# Patient Record
Sex: FEMALE | Race: AMERICAN INDIAN OR ALASKA NATIVE
[De-identification: names, ages, dates, MRNs, and addresses within clinical notes are randomized per-mention and may not be internally consistent; named-entity substitution may affect disease eponyms.]

---

## 2019-09-28 ENCOUNTER — HOSPITAL ENCOUNTER (OUTPATIENT)
Dept: HOSPITAL 5 - TRG | Age: 44
Discharge: HOME | End: 2019-09-28
Attending: OBSTETRICS & GYNECOLOGY
Payer: COMMERCIAL

## 2019-09-28 VITALS — DIASTOLIC BLOOD PRESSURE: 74 MMHG | SYSTOLIC BLOOD PRESSURE: 122 MMHG

## 2019-09-28 DIAGNOSIS — O47.1: Primary | ICD-10-CM

## 2019-09-28 DIAGNOSIS — Z3A.38: ICD-10-CM

## 2019-09-28 PROCEDURE — 76815 OB US LIMITED FETUS(S): CPT

## 2019-09-28 PROCEDURE — 76819 FETAL BIOPHYS PROFIL W/O NST: CPT

## 2019-09-28 PROCEDURE — 59025 FETAL NON-STRESS TEST: CPT

## 2019-09-28 NOTE — ULTRASOUND REPORT
ULTRASOUND FETAL BIOPHYSICAL PROFILE AND LIMITED OB ULTRASOUND



INDICATION / CLINICAL INFORMATION:

LIBBY, Decreased fetal movement.



COMPARISON:

None available.



FINDINGS:



FETAL BREATHING MOVEMENT = 2

GROSS BODY MOVEMENT = 2

FETAL TONE = 2

QUALITATIVE AMNIOTIC FLUID VOLUME = 2



TOTAL BIOPHYSICAL SCORE = 8/8



AMNIOTIC FLUID INDEX (cm) =  20.8

PRESENTATION: Cephalic. 

FETAL HEART RATE (beats per minute): 153 



IMPRESSION:

1. Fetal biophysical profile = 8/8 

2. LIBBY = 20.8 cm



Signer Name: Alvin Morin MD 

Signed: 9/28/2019 12:18 PM

 Workstation Name: [id number]

## 2019-09-28 NOTE — PROGRESS NOTE
Subjective





- Subjective


Date of service: 09/28/19


Interval history: 





Patient seen in OB triage


CC: decreased fetal movement


no contractions, no loss of fluid, no vaginal bleeding


+ve maternal obesity and fetal polyhydramnios contributing to maternal 

perception of movement. 


BPP 8/8


NST: difficult to trace but at bedside auscultation +ve accelerations and god 

beat to beat variability with baseline of 150bpm. 


PLan to d/c to home with labor precautions. 


Plan for ERCS 10/9/19


not in labor, maternal, fetal status reassuring at bedside. 


Kin SYLVESTER








Objective





- Vital Signs


Vital Signs: 


                               Vital Signs - 12hr











  09/28/19 09/28/19 09/28/19





  10:32 10:36 11:01


 


Temperature 98.3 F  


 


Pulse Rate  91 H 101 H


 


Respiratory 18  





Rate   


 


Blood Pressure  122/74 


 


O2 Sat by Pulse   95





Oximetry   














  09/28/19 09/28/19 09/28/19





  11:06 11:11 11:16


 


Temperature   


 


Pulse Rate 98 H 97 H 99 H


 


Respiratory   





Rate   


 


Blood Pressure   


 


O2 Sat by Pulse 95 97 96





Oximetry   














  09/28/19 09/28/19 09/28/19





  11:21 11:26 11:31


 


Temperature   


 


Pulse Rate 98 H 96 H 98 H


 


Respiratory   





Rate   


 


Blood Pressure   


 


O2 Sat by Pulse 97 96 98





Oximetry   














  09/28/19 09/28/19 09/28/19





  11:36 11:41 11:46


 


Temperature   


 


Pulse Rate 96 H 99 H 95 H


 


Respiratory   





Rate   


 


Blood Pressure   


 


O2 Sat by Pulse 97 96 97





Oximetry   














  09/28/19 09/28/19 09/28/19





  11:51 11:53 11:56


 


Temperature   


 


Pulse Rate 100 H 97 H 97 H


 


Respiratory   





Rate   


 


Blood Pressure   


 


O2 Sat by Pulse 96 92 97





Oximetry   














  09/28/19 09/28/19





  12:01 12:06


 


Temperature  


 


Pulse Rate 102 H 99 H


 


Respiratory  





Rate  


 


Blood Pressure  


 


O2 Sat by Pulse 97 98





Oximetry

## 2019-09-28 NOTE — ULTRASOUND REPORT
ULTRASOUND FETAL BIOPHYSICAL PROFILE AND LIMITED OB ULTRASOUND



INDICATION / CLINICAL INFORMATION:

LIBBY, Decreased fetal movement.



COMPARISON:

None available.



FINDINGS:



FETAL BREATHING MOVEMENT = 2

GROSS BODY MOVEMENT = 2

FETAL TONE = 2

QUALITATIVE AMNIOTIC FLUID VOLUME = 2



TOTAL BIOPHYSICAL SCORE = 8/8



AMNIOTIC FLUID INDEX (cm) =  20.8

PRESENTATION: Cephalic. 

FETAL HEART RATE (beats per minute): 153 



IMPRESSION:

1. Fetal biophysical profile = 8/8 

2. LIBBY = 20.8 cm



Signer Name: Alvin Morin MD 

Signed: 9/28/2019 12:18 PM

 Workstation Name: [id number]

## 2019-10-03 ENCOUNTER — HOSPITAL ENCOUNTER (INPATIENT)
Dept: HOSPITAL 5 - APU | Age: 44
LOS: 9 days | Discharge: HOME | End: 2019-10-12
Attending: OBSTETRICS & GYNECOLOGY | Admitting: OBSTETRICS & GYNECOLOGY
Payer: COMMERCIAL

## 2019-10-03 DIAGNOSIS — O34.211: Primary | ICD-10-CM

## 2019-10-03 DIAGNOSIS — Z88.1: ICD-10-CM

## 2019-10-03 DIAGNOSIS — E03.9: ICD-10-CM

## 2019-10-03 DIAGNOSIS — K66.0: ICD-10-CM

## 2019-10-03 DIAGNOSIS — Z3A.39: ICD-10-CM

## 2019-10-03 DIAGNOSIS — D62: ICD-10-CM

## 2019-10-03 DIAGNOSIS — Z79.899: ICD-10-CM

## 2019-10-03 DIAGNOSIS — E66.01: ICD-10-CM

## 2019-10-03 DIAGNOSIS — K21.9: ICD-10-CM

## 2019-10-03 PROCEDURE — 81001 URINALYSIS AUTO W/SCOPE: CPT

## 2019-10-03 PROCEDURE — 82803 BLOOD GASES ANY COMBINATION: CPT

## 2019-10-03 PROCEDURE — 76819 FETAL BIOPHYS PROFIL W/O NST: CPT

## 2019-10-03 PROCEDURE — 85014 HEMATOCRIT: CPT

## 2019-10-03 PROCEDURE — 80053 COMPREHEN METABOLIC PANEL: CPT

## 2019-10-03 PROCEDURE — 85007 BL SMEAR W/DIFF WBC COUNT: CPT

## 2019-10-03 PROCEDURE — 86901 BLOOD TYPING SEROLOGIC RH(D): CPT

## 2019-10-03 PROCEDURE — 85027 COMPLETE CBC AUTOMATED: CPT

## 2019-10-03 PROCEDURE — 88307 TISSUE EXAM BY PATHOLOGIST: CPT

## 2019-10-03 PROCEDURE — 83615 LACTATE (LD) (LDH) ENZYME: CPT

## 2019-10-03 PROCEDURE — 86900 BLOOD TYPING SEROLOGIC ABO: CPT

## 2019-10-03 PROCEDURE — 86592 SYPHILIS TEST NON-TREP QUAL: CPT

## 2019-10-03 PROCEDURE — 86850 RBC ANTIBODY SCREEN: CPT

## 2019-10-03 PROCEDURE — 85018 HEMOGLOBIN: CPT

## 2019-10-03 PROCEDURE — 76815 OB US LIMITED FETUS(S): CPT

## 2019-10-03 PROCEDURE — 86762 RUBELLA ANTIBODY: CPT

## 2019-10-03 PROCEDURE — 85025 COMPLETE CBC W/AUTO DIFF WBC: CPT

## 2019-10-03 PROCEDURE — 84550 ASSAY OF BLOOD/URIC ACID: CPT

## 2019-10-03 PROCEDURE — 36415 COLL VENOUS BLD VENIPUNCTURE: CPT

## 2019-10-06 ENCOUNTER — HOSPITAL ENCOUNTER (OUTPATIENT)
Dept: HOSPITAL 5 - TRG | Age: 44
Discharge: HOME | End: 2019-10-06
Attending: OBSTETRICS & GYNECOLOGY
Payer: COMMERCIAL

## 2019-10-06 VITALS — DIASTOLIC BLOOD PRESSURE: 66 MMHG | SYSTOLIC BLOOD PRESSURE: 109 MMHG

## 2019-10-06 DIAGNOSIS — Z3A.39: ICD-10-CM

## 2019-10-06 DIAGNOSIS — R42: ICD-10-CM

## 2019-10-06 DIAGNOSIS — R10.9: ICD-10-CM

## 2019-10-06 DIAGNOSIS — O26.893: Primary | ICD-10-CM

## 2019-10-06 LAB
ALBUMIN SERPL-MCNC: 3.3 G/DL (ref 3.9–5)
ALT SERPL-CCNC: 10 UNITS/L (ref 7–56)
BILIRUB UR QL STRIP: (no result)
BLOOD UR QL VISUAL: (no result)
BUN SERPL-MCNC: 10 MG/DL (ref 7–17)
BUN/CREAT SERPL: 17 %
CALCIUM SERPL-MCNC: 9.7 MG/DL (ref 8.4–10.2)
HCT VFR BLD CALC: 36.2 % (ref 30.3–42.9)
HEMOLYSIS INDEX: 20
HGB BLD-MCNC: 12 GM/DL (ref 10.1–14.3)
MCHC RBC AUTO-ENTMCNC: 33 % (ref 30–34)
MCV RBC AUTO: 89 FL (ref 79–97)
MUCOUS THREADS #/AREA URNS HPF: (no result) /HPF
PH UR STRIP: 7 [PH] (ref 5–7)
PLATELET # BLD: 246 K/MM3 (ref 140–440)
PROT UR STRIP-MCNC: (no result) MG/DL
RBC # BLD AUTO: 4.08 M/MM3 (ref 3.65–5.03)
RBC #/AREA URNS HPF: 2 /HPF (ref 0–6)
URATE SERPL-MCNC: 4.8 MG/DL (ref 3.5–7.6)
UROBILINOGEN UR-MCNC: < 2 MG/DL (ref ?–2)
WBC #/AREA URNS HPF: 1 /HPF (ref 0–6)

## 2019-10-06 PROCEDURE — 76819 FETAL BIOPHYS PROFIL W/O NST: CPT

## 2019-10-06 PROCEDURE — 85027 COMPLETE CBC AUTOMATED: CPT

## 2019-10-06 PROCEDURE — 76815 OB US LIMITED FETUS(S): CPT

## 2019-10-06 PROCEDURE — 81001 URINALYSIS AUTO W/SCOPE: CPT

## 2019-10-06 PROCEDURE — 36415 COLL VENOUS BLD VENIPUNCTURE: CPT

## 2019-10-06 PROCEDURE — 84550 ASSAY OF BLOOD/URIC ACID: CPT

## 2019-10-06 PROCEDURE — 80053 COMPREHEN METABOLIC PANEL: CPT

## 2019-10-06 PROCEDURE — 83615 LACTATE (LD) (LDH) ENZYME: CPT

## 2019-10-06 NOTE — ULTRASOUND REPORT
ULTRASOUND OBSTETRIC LIMITED 

ULTRASOUND FETAL BIOPHYSICAL PROFILE



INDICATION / CLINICAL INFORMATION:

Abdominal pain. Clinical dates of 38 weeks 4 days



COMPARISON:

9/28/2019



FINDINGS:



FETAL BREATHING MOVEMENT = 2

GROSS BODY MOVEMENT = 2

FETAL TONE = 2

QUALITATIVE AMNIOTIC FLUID VOLUME = 2



TOTAL BIOPHYSICAL SCORE = 8/8



AMNIOTIC FLUID INDEX (cm) =  15.2 cm

PRESENTATION: Cephalic. 

FETAL HEART RATE (beats per minute): 1 56 bpm 



ADDITIONAL FINDINGS: The placenta is located anteriorly, left lateral in position. Grade 3.



IMPRESSION:

1. Fetal Biophysical Score = 8/8

2. Single viable IUP in a cephalic presentation with anterior placenta.



Signer Name: Barb Jefferson MD 

Signed: 10/6/2019 11:00 PM

 Workstation Name: [id number]

## 2019-10-06 NOTE — PROGRESS NOTE
Assessment and Plan


A: Pregnancy at 38 weeks, 4 days gestation.


Not in active labor. 


Abdominal/pelvic pain--resolved.


BPP 8/8 and normal OB US.


P: Consulted with Dr. Landaverde re: patient and her history of previous C/S and 

previous myomectomy. Dr. Landaverde states is it OK to discharge patient home.


Discharge instructions and warning signs discussed with patient. Advised patient

to perform daily fetal movement counting. Warning signs of late pregnancy and 

signs of labor discussed. Advised patient to keep her scheduled appointment 

tomorrow at [facility] and return for repeat C/S as scheduled this week. 

Return promptly if any problems in the interim. 











Subjective





- Subjective


Date of service: 10/06/19


Principal diagnosis: Pregnancy at 38 4/7 weeks; R/O labor


Interval history: 


44 year old  presents to triage at 38 4/7 weeks gestation complaining of 

a brief sharp abdominal pain in upper abdomen while napping at home earlier this

afternoon. Patient states pain was brief and resolved by the time she came to 

the hospital. Patient denies vaginal bleeding or leaking of fluid or regular 

contractions. Patient denies urinary or bowel symptoms. Patient reports active 

fetal movement. Patient denies falls or abdominal trauma. 


Patient receives prenatal care at [facility] and she states she has an 

appointment there tomorrow. Patient states she was told that her BP was high at 

her last visit and she states she was also told she had protein in her urine at 

her last visit. She states she is scheduled for a  section this week on 

Wednesday 10/9/19. History of a low transverse  section 23 years ago and

history of a myomectomy 10 years ago. Patient denies any pain on scar. 








Patient reports: fetal movement normal, no loss of fluid, no vaginal bleeding, 

no contractions





Objective





- Vital Signs


Vital Signs: 


                               Vital Signs - 12hr











  10/06/19 10/06/19 10/06/19





  16:13 16:14 17:12


 


Temperature 97.9 F  


 


Pulse Rate 93 H 93 H 97 H


 


Respiratory 18  





Rate   


 


Blood Pressure  117/68 109/66


 


Blood Pressure 117/68  





[Left]   














- Exam


Narrative Exam: 


US showed no signs of placental abruption, LIBBY of 15.2, and BPP 8/8. Cephalic 

presentation by US. 





Abdomen: Present: normal appearance, soft.  Absent: distention, tenderness, 

guarding, rigidity


Uterus: Present: fundal height above umbilicus.  Absent: tenderness


FHR: category 1


Uterine Contraction Monitor Mode: External


Cervical Dilatation: 0


Cervical Effacement Percentage: 0


Fetal station: -3


Uterine Contraction Pattern: Irregular


Uterine Contraction Intensity: Mild


Extremities: normal





- Labs


Labs: 


                                  Abnormal Labs











  10/06/19





  18:00


 


Sodium  132 L


 


Chloride  97.4 L


 


Carbon Dioxide  21 L


 


Creatinine  0.6 L


 


Alkaline Phosphatase  154 H


 


Albumin  3.3 L








                         Laboratory Results - last 24 hr











  10/06/19 10/06/19 10/06/19





  16:54 18:00 18:00


 


WBC   10.5 


 


RBC   4.08 


 


Hgb   12.0 


 


Hct   36.2 


 


MCV   89 


 


MCH   29 


 


MCHC   33 


 


RDW   14.8 


 


Plt Count   246 


 


Sodium    132 L


 


Potassium    4.2


 


Chloride    97.4 L


 


Carbon Dioxide    21 L


 


Anion Gap    18


 


BUN    10


 


Creatinine    0.6 L


 


Estimated GFR    > 60


 


BUN/Creatinine Ratio    17


 


Glucose    82


 


Uric Acid    4.8


 


Calcium    9.7


 


Total Bilirubin    0.30


 


AST    14


 


ALT    10


 


Alkaline Phosphatase    154 H


 


Lactate Dehydrogenase    170


 


Total Protein    7.8


 


Albumin    3.3 L


 


Albumin/Globulin Ratio    0.7


 


Urine Color  Straw  


 


Urine Turbidity  Clear  


 


Urine pH  7.0  


 


Ur Specific Gravity  1.008  


 


Urine Protein  <15 mg/dl  


 


Urine Glucose (UA)  Neg  


 


Urine Ketones  Neg  


 


Urine Blood  Neg  


 


Urine Nitrite  Neg  


 


Urine Bilirubin  Neg  


 


Urine Urobilinogen  < 2.0  


 


Ur Leukocyte Esterase  Neg  


 


Urine WBC (Auto)  1.0  


 


Urine RBC (Auto)  2.0  


 


U Epithel Cells (Auto)  < 1.0  


 


Urine Mucus  Few

## 2019-10-06 NOTE — ULTRASOUND REPORT
ULTRASOUND OBSTETRIC LIMITED 

ULTRASOUND FETAL BIOPHYSICAL PROFILE



INDICATION / CLINICAL INFORMATION:

Abdominal pain. Clinical dates of 38 weeks 4 days



COMPARISON:

9/28/2019



FINDINGS:



FETAL BREATHING MOVEMENT = 2

GROSS BODY MOVEMENT = 2

FETAL TONE = 2

QUALITATIVE AMNIOTIC FLUID VOLUME = 2



TOTAL BIOPHYSICAL SCORE = 8/8



AMNIOTIC FLUID INDEX (cm) =  15.2 cm

PRESENTATION: Cephalic. 

FETAL HEART RATE (beats per minute): 1 56 bpm 



ADDITIONAL FINDINGS: The placenta is located anteriorly, left lateral in position. Grade 3.



IMPRESSION:

1. Fetal Biophysical Score = 8/8

2. Single viable IUP in a cephalic presentation with anterior placenta.



Signer Name: Barb Jefferson MD 

Signed: 10/6/2019 11:00 PM

 Workstation Name: [id number]

## 2019-10-09 LAB
BAND NEUTROPHILS # (MANUAL): 0.4 K/MM3
HCT VFR BLD CALC: 27.6 % (ref 30.3–42.9)
HCT VFR BLD CALC: 31.7 % (ref 30.3–42.9)
HCT VFR BLD CALC: 34.4 % (ref 30.3–42.9)
HGB BLD-MCNC: 10.5 GM/DL (ref 10.1–14.3)
HGB BLD-MCNC: 11.4 GM/DL (ref 10.1–14.3)
HGB BLD-MCNC: 9.2 GM/DL (ref 10.1–14.3)
MCHC RBC AUTO-ENTMCNC: 33 % (ref 30–34)
MCHC RBC AUTO-ENTMCNC: 33 % (ref 30–34)
MCV RBC AUTO: 89 FL (ref 79–97)
MCV RBC AUTO: 89 FL (ref 79–97)
MYELOCYTES # (MANUAL): 0 K/MM3
PLATELET # BLD: 238 K/MM3 (ref 140–440)
PLATELET # BLD: 242 K/MM3 (ref 140–440)
PROMYELOCYTES # (MANUAL): 0 K/MM3
RBC # BLD AUTO: 3.56 M/MM3 (ref 3.65–5.03)
RBC # BLD AUTO: 3.87 M/MM3 (ref 3.65–5.03)
TOTAL CELLS COUNTED BLD: 100

## 2019-10-09 PROCEDURE — 0DNW0ZZ RELEASE PERITONEUM, OPEN APPROACH: ICD-10-PCS | Performed by: OBSTETRICS & GYNECOLOGY

## 2019-10-09 PROCEDURE — 4A033R1 MEASUREMENT OF ARTERIAL SATURATION, PERIPHERAL, PERCUTANEOUS APPROACH: ICD-10-PCS | Performed by: OBSTETRICS & GYNECOLOGY

## 2019-10-09 RX ADMIN — KETOROLAC TROMETHAMINE PRN MG: 30 INJECTION, SOLUTION INTRAMUSCULAR at 16:11

## 2019-10-09 NOTE — ANESTHESIA CONSULTATION
Anesthesia Consult and Med Hx


Date of service: 10/09/19





- Airway


Anesthetic Teeth Evaluation: Poor, Chipped


Mallampati Class: Class III


Intubation Access Assessment: Probably Good





- Pulmonary Exam


CTA: Yes





- Cardiac Exam


Cardiac Exam: RRR





- Pre-Operative Health Status


ASA Pre-Surgery Classification: ASA3


Proposed Anesthetic Plan: Spinal





- Pre-Anesthesia Comment


Pre-Anesthesia Comments: PSH: C/S 23yrs ago under [facility], Right thyroidectomy 20 

yrs ago, Lumbar Steriod injections x3 last 2017. No anesthesia complications.





- Pulmonary


Hx Smoking: No


Hx Asthma: No


Hx Respiratory Symptoms: No


SOB: No


COPD: No


Home Oxygen Therapy: No


Hx Pneumonia: No


Hx Sleep Apnea: No





- Cardiovascular System


Hx Hypertension: No


Hx Coronary Artery Disease: No


Hx Heart Attack/AMI: No


Hx Angina: No


Hx Percutaneous Transluminal Coronary Angioplasty (PTCA): No


Hx Cardia Arrhythmia: No


Hx Pacemaker: No


Hx Internal Defibrillator: No


Hx Valvular Heart Disease: No


Hx Heart Murmur: No


Hx Peripheral Vascular Disease: No





- Central Nervous System


Hx Neuromuscular Disorder: No


Hx Seizures: No


CVA: No


Hx Back Pain: Yes (lumbar steroid injections, chronic back pain from work 

injuries)


Hx Psychiatric Problems: No





- Gastrointestinal


Hx Ulcer: No


Hx Gastroesophageal Reflux Disease: Yes





- Endocrine


Hx Renal Disease: No


Hx Cirrhosis: No


Hx Liver Disease: No


Hx Insulin Dependent Diabetes: No


Hx Non-Insulin Dependent Diabetes: No


Hx Thyroid Disease: No


Hx Hypothyroidism: Yes


Hx Hyperthyroidism: No





- Hematic


Hx Anemia: No


Hx Sickle Cell Disease: No





- Other Systems


Hx Alcohol Use: No


Hx Substance Use: No


Hx Cancer: No


Hx Obesity: Yes (BMI 45.7)

## 2019-10-09 NOTE — ANESTHESIA DAY OF SURGERY
Anesthesia Day of Surgery





- Day of Surgery


Patient Examined: Yes


Patient H&P Reviewed: Yes


Patient is NPO: Yes


Beta Blockers: No


Cardiac Clearance: No


Pulmonary Clearance: No


López's Test: N/A

## 2019-10-09 NOTE — PROCEDURE NOTE
OB Delivery Note





- Delivery


Date of Delivery: 10/09/19


Surgeon: MYLA WETZEL


Estimated blood loss: other (900ml)





-  Section


Postop diagnosis: same


 section procedure: repeat low transverse (low transverse hysterotoomy 

with a "T" vertical extension~3cm )


Complications: other (extensive intra-abdominal adhesions, difficulty delivery 

fetal head)


Narrative: 





Preoperative diagnosis: IUP at 39+0/7 weeks, repeat  section declines 

TOLAC


Postoperative diagnosis: same


Procedure: Elective Repeat Low Transverse  section via Pfannenstiel 

incision, vertical extension of hysterotomy ~3cm. 


Surgeon: Dr Myla Wetzel 


Assist: Scrub


Anesthesia: Spinal


Findings: viable male, 4557gms and APGAR 8/9. Normal Uterus, tubes and ovaries 

bilaterally, dense abdominal adhesions 


Complications:dense adhesions, difficulty delivery fetal head


Drains: Rcie to gravity


EBL: 900mL


IV Fluids: 1300ml


Urine Output:300ml





Procedure: Patient gave informed consent in OB triage. All questions and 

concerns addressed. R/B/C reviewed. She was taken to the OR where excellent  

anesthesia was confirmed. She was placed in the dorsal supine position with a 

leftward tilt. She was prepped and draped in a sterile fashion. A time out was 

verified. An Litzy clamp was used to assure adequate analgesia. A Pfannenstiel 

skin incision was made, taken down through the underlying fascia sharply and 

extended laterally with curved Montemayor scissors. The superior and inferior aspect 

of the fascial incision was grasped with Kocher clamps and the rectus muscles 

dissected off sharply. The abdomen was entered sharply in the midline and 

extended laterally and inferiorly sharply with good visualization of the 

underlying structures.A bladder blade was inserted. Dense abdominal adhesions 

were noted and the rectus muscles were transected with the Bovie to assist in 

visualization and delivery. The uterine incision was made sharply with a 

scalpel, taken down to the amnion and extended inferiorly and superiorly 

bluntly. The bladder blade removed  and a KIWI Vacuum used x 3 pulls to assist 

in delivery of baby. With resistance of delivery of the fetal head a vertical 

extension of the uterine incision was made sharply. The baby delivered 

atraumatically with the third pull, no excess traction of the KIWI was applied, 

no nuchal cord and spontaneous cry at delivery noted. Cord clamped and cut and 

baby handed to waiting pediatrician staff. Cord segment for cord gas obtained. 

An intact placenta with three vessel cord delivered manually. The uterus cleared

of all clots and debris. The uterus was  not exteriorized second to dense 

abdomino-pelvic adhesions.  The uterine incision closed with 2 layers of 0-

Vicryl.  The rectus muscles approximated with 0-vicry with good hemostasis. . A 

second look at the uterine incision assured excellent hemostasis. A procoagulant

was applied to the uterine incision. The peritoneum was closed with 3-0 Vicryl, 

the fascia closed with 0-Vicryl in the usual fashion, and the underlying 

structures closed with interrupted suture of O-Vicryl.  The skin closed with 

staples and a pressure dressing applied. All sponge and needle counts correctx3.

Mom and baby stable to postpartum. EBL 9000ml. Urine output confirmed clear and 

adequate at completion of procedure.


Kin SYLVESTER

## 2019-10-09 NOTE — HISTORY AND PHYSICAL REPORT
History of Present Illness


Date of examination: 10/09/19


Date of admission: 


10/09/19 05:12





Chief complaint: 





elective Repeat  Section at term


History of present illness: 





 IUP at 39+ weeks, elective repeat  section 


Maternal Morbid Obesity


Previous uterine surgery for myomectomy





Past History


Past Surgical History: GYN/uterine surgery,  section


Family/Genetic History: none


Social history: no significant social history, single





- Obstetrical History


Expected Date of Delivery: 10/16/19


Actual Gestation: 39 Week(s) 0 Day(s) 


: 2


Para: 1





Medications and Allergies


                                    Allergies











Allergy/AdvReac Type Severity Reaction Status Date / Time


 


amoxicillin Allergy Intermediate Rash Verified 10/06/19 16:16











                                Home Medications











 Medication  Instructions  Recorded  Confirmed  Last Taken  Type


 


Iron 1 tab PO BID 19 06:30 History


 


Prenatal Plus Iron Tablet 1 tab PO DAILY 19 06:30 

History











Active Meds: 


Active Medications





Diphenhydramine HCl (Benadryl)  12.5 mg IV Q2H PRN


   PRN Reason: Itching


Hydromorphone HCl (Dilaudid)  0.5 mg IV Q5M PRN


   PRN Reason: BREAK


   Stop: 10/09/19 15:00


Hydromorphone HCl (Dilaudid)  0.5 mg IV Q4H PRN


   PRN Reason: breakthrough pain > 7/10


Oxytocin/Sodium Chloride (Pitocin/Ns 20 Unit/1000ml Drip)  20 units in 1,000 mls

@ 0 mls/hr IV TITR MISSY


Lactated Ringer's (Lactated Ringers)  1,000 mls @ 2,250 mls/hr IV PREOP MISSY


   Stop: 10/10/19 06:27


Clindamycin HCl (Cleocin 900 Mg/50 Ml)  900 mg in 50 mls @ 100 mls/hr IV PREOP 

NR; Protocol


   Stop: 10/09/19 23:45


Ondansetron HCl (Zofran)  4 mg IV Q8H PRN


   PRN Reason: Nausea And Vomiting


Sodium Chloride (Sodium Chloride Flush Syringe 10 Ml)  10 ml IV PRN PRN


   PRN Reason: flush











Review of Systems


All systems: negative (N OB complaints at bedside, GFM, no LOF, no VB, no CTX)





- Vital Signs


Vital signs: 


                                   Vital Signs











Temp Pulse Resp BP Pulse Ox


 


 97.3 F L  103 H  18   132/78   96 


 


 10/09/19 05:30  10/09/19 05:30  10/09/19 05:30  10/09/19 05:30  10/09/19 05:30








                                        











Temp Pulse Resp BP Pulse Ox


 


 97.3 F L  108 H  18   128/87   91 


 


 10/09/19 05:30  10/09/19 07:20  10/09/19 05:30  10/09/19 07:20  10/09/19 07:20














- Physical Exam


Breasts: Positive: deferred


Cardiovascular: Regular rate


Abdomen: Positive: normal appearance, soft


Genitourinary (Female): Positive: normal external genitalia


Extremities: Positive: normal


Deep Tendon Reflex Grade: Normal +2





- Obstetrical


FHR: category 1





Results


Result Diagrams: 


                                 10/09/19 10:37





All other labs normal.








Assessment and Plan





IUP At 39+ weeks, previous  section for ERCS


Previous laparoscopic myomectomy





P:


informed consent obtained


HB 11.4


Anesthesia notified


Patient NPO and on call to OR


VAMSHI Lee MD

## 2019-10-10 RX ADMIN — KETOROLAC TROMETHAMINE PRN MG: 30 INJECTION, SOLUTION INTRAMUSCULAR at 06:36

## 2019-10-10 RX ADMIN — OXYCODONE AND ACETAMINOPHEN PRN TAB: 5; 325 TABLET ORAL at 12:49

## 2019-10-10 NOTE — PROGRESS NOTE
Assessment and Plan





A: POD#1 s/p R c/s


    Stable


    VSS


   


P: Routine PO/PP care


    Abdominal binder


   Anticipate discharge home 24-48 hrs





Subjective





- Subjective


Date of service: 10/10/19


Principal diagnosis: POD#1 s/p Repeat c/s


Interval history: 





See H&P and delivery note


Patient reports: appetite normal, voiding normally, pain well controlled, 

flatus, ambulating normally, no bowel movement


: doing well





Objective





- Vital Signs


Latest vital signs: 


                                   Vital Signs











  Temp Pulse Resp BP BP Pulse Ox


 


 10/10/19 15:10  98.1 F  108 H  20   102/53 


 


 10/10/19 14:15      114/71 


 


 10/10/19 08:42  98.5 F  103 H  20   99/54  96


 


 10/10/19 04:20  98.3 F  98 H  18   103/66  97


 


 10/10/19 01:43  98.6 F  121 H  20  102/62   97


 


 10/09/19 21:08  98.6 F  111 H  20  103/62   95








                                Intake and Output











 10/10/19 10/10/19 10/10/19





 07:59 15:59 23:59


 


Intake Total 240 920 


 


Output Total 400  


 


Balance -160 920 


 


Intake:   


 


  Oral 240 920 


 


Output:   


 


  Urine 400  


 


    Void 400  


 


Other:   


 


  Total, Intake Amount 240 320 


 


  Total, Output Amount 400  


 


  # Voids   


 


    Void  1 














- Exam


Breasts: Present: normal


Cardiovascular: Present: Regular rate, Normal S1, Normal S2, No murmurs


Lungs: Present: Clear to auscultation, Normal air movement


Abdomen: Present: normal appearance, soft, normal bowel sounds.  Absent: 

distention, tenderness


Vulva: both: normal


Uterus: Present: firm, fundal height below umbilicus (-1)


Extremities: Present: normal


Deep Tendon Reflex Grade: Normal +2


Incision: Present: normal, dry, intact, dressed (Pressure dressing CDI)





- Labs


Labs: 


                              Abnormal lab results











  10/09/19 Range/Units





  19:32 


 


Hgb  9.2 L  (10.1-14.3)  gm/dl


 


Hct  27.6 L  (30.3-42.9)  %

## 2019-10-11 RX ADMIN — OXYCODONE AND ACETAMINOPHEN PRN TAB: 5; 325 TABLET ORAL at 00:58

## 2019-10-11 RX ADMIN — IBUPROFEN PRN MG: 800 TABLET, FILM COATED ORAL at 18:04

## 2019-10-11 RX ADMIN — IBUPROFEN PRN MG: 800 TABLET, FILM COATED ORAL at 10:03

## 2019-10-11 RX ADMIN — OXYCODONE AND ACETAMINOPHEN PRN TAB: 5; 325 TABLET ORAL at 10:32

## 2019-10-11 RX ADMIN — FERROUS SULFATE TAB 325 MG (65 MG ELEMENTAL FE) SCH MG: 325 (65 FE) TAB at 10:03

## 2019-10-11 NOTE — DISCHARGE SUMMARY
Providers





- Providers


Date of Admission: 


10/09/19 05:12





Date of discharge: 10/12/19 (1500)


Attending physician: 


MYLA WETZEL MD





Primary care physician: 


MYLA WETZEL MD








Hospitalization


Reason for admission:  section, IUP at term


Delivery: 


Procedure:  section, repeat low transverse


Episiotomy: none


Laceration: none


Incision: dry (no signs of infection noted), intact


Other postpartum procedures: none


Postpartum complications: none


Discharge diagnosis: other (S/P repeat C/S; Anemia)


[place] baby: male


Hospital course: 





See admission H & P; OB operative note and PP progress notes


Condition at discharge: Stable


Disposition: DC-01 TO HOME OR SELFCARE





- Discharge Diagnoses


(1) S/P repeat low transverse 


Status: Acute   





(2) Anemia


Status: Acute   


Qualifiers: 


   Anemia type: other cause   Other causes of anemia: acute posthemorrhagic   

Qualified Code(s): D62 - Acute posthemorrhagic anemia   





Plan





- Discharge Medications


Prescriptions: 


Ferrous Sulfate [Feosol 325 MG tab] 325 mg PO QDAY 30 Days #30 tablet


Ibuprofen [Motrin] 600 mg PO Q8H PRN #30 tablet


 PRN Reason: Pain


oxyCODONE /ACETAMINOPHEN [Percocet 5/325] 1 tab PO Q4HR #20 tab





- Provider Discharge Summary


Activity: routine, no sex for 6 weeks, no heavy lifting 4 weeks, no strenuous 

exercise


Diet: other (Iron rich diet)


Instructions: routine


Additional instructions: 


[]  Smoking cessation referral if applicable(refer to patient education folder 

for contact #)


[]  Refer to [facility] Booklet








Call your doctor immediately for:


* Fever > 100.5


* Heavy vaginal bleeding ( >1 pad per hour)


* Severe persistent headache


* Shortness of breath


* Reddened, hot, painful area to leg or breast


* Drainage or odor from incision.





* Keep incision clean and dry at all times and follow doctor's instructions 

regarding bathing/showering











- Follow up plan


Follow up: 


MYLA WETZEL MD [Primary Care Provider] - 7 Days

## 2019-10-11 NOTE — PROGRESS NOTE
Assessment and Plan





- Patient Problems


(1) S/P repeat low transverse 


Current Visit: Yes   Status: Acute   


Plan to address problem: 


Continue routine PP orders


 Keep incision dry and intact


 Anticipate d/c home tomorrow








(2) Anemia


Current Visit: Yes   Status: Acute   


Qualifiers: 


   Anemia type: other cause   Other causes of anemia: acute posthemorrhagic   

Qualified Code(s): D62 - Acute posthemorrhagic anemia   


Plan to address problem: 


Asymptomatic


 Continue daily oral iron supplementation


 Increase iron rich foods into daily diet








Subjective





- Subjective


Date of service: 10/11/19


Principal diagnosis: POD#2 s/p Repeat c/s


Interval history: 





See admission H & P; OB operative note and PP progress notes


Patient reports: appetite normal, voiding normally, pain well controlled (with 

medications), flatus, ambulating normally, no bowel movement


[place]: doing well, bottle feeding





Objective





- Vital Signs


Latest vital signs: 


                                   Vital Signs











  Temp Pulse Resp BP BP Pulse Ox


 


 10/11/19 09:22  99.3 F  119 H  18  112/53   98


 


 10/10/19 23:54  98.2 F  120 H  20  106/61   99


 


 10/10/19 15:10  98.1 F  108 H  20   102/53 


 


 10/10/19 14:15      114/71 








                                Intake and Output











 10/10/19 10/11/19 10/11/19





 23:59 07:59 15:59


 


Intake Total 360 480 


 


Balance 360 480 


 


Intake:   


 


  Oral 360 480 


 


Other:   


 


  Total, Intake Amount 120 240 


 


  # Voids   


 


    Void 1 1 














- Exam


Breasts: Present: normal


Cardiovascular: Present: Regular rate


Lungs: Present: Normal air movement


Abdomen: Present: soft, tenderness


Uterus: Present: firm, fundal height below umbilicus (U-1)


Extremities: Present: normal


Deep Tendon Reflex Grade: Normal +2


Incision: Present: intact (no signs of infection noted)

## 2019-10-12 VITALS — DIASTOLIC BLOOD PRESSURE: 57 MMHG | SYSTOLIC BLOOD PRESSURE: 108 MMHG

## 2019-10-12 RX ADMIN — IBUPROFEN PRN MG: 800 TABLET, FILM COATED ORAL at 06:08

## 2019-10-12 RX ADMIN — IBUPROFEN PRN MG: 800 TABLET, FILM COATED ORAL at 12:50

## 2019-10-12 RX ADMIN — FERROUS SULFATE TAB 325 MG (65 MG ELEMENTAL FE) SCH MG: 325 (65 FE) TAB at 12:50

## 2019-10-12 RX ADMIN — IBUPROFEN PRN MG: 800 TABLET, FILM COATED ORAL at 00:40
